# Patient Record
Sex: FEMALE | Race: WHITE | NOT HISPANIC OR LATINO | Employment: UNEMPLOYED | URBAN - METROPOLITAN AREA
[De-identification: names, ages, dates, MRNs, and addresses within clinical notes are randomized per-mention and may not be internally consistent; named-entity substitution may affect disease eponyms.]

---

## 2017-08-17 ENCOUNTER — LAB (OUTPATIENT)
Dept: LAB | Facility: HOSPITAL | Age: 82
End: 2017-08-17

## 2017-08-17 ENCOUNTER — APPOINTMENT (OUTPATIENT)
Dept: ONCOLOGY | Facility: CLINIC | Age: 82
End: 2017-08-17

## 2017-08-17 ENCOUNTER — APPOINTMENT (OUTPATIENT)
Dept: LAB | Facility: HOSPITAL | Age: 82
End: 2017-08-17

## 2017-08-17 ENCOUNTER — OFFICE VISIT (OUTPATIENT)
Dept: ONCOLOGY | Facility: CLINIC | Age: 82
End: 2017-08-17

## 2017-08-17 VITALS
SYSTOLIC BLOOD PRESSURE: 138 MMHG | BODY MASS INDEX: 22.73 KG/M2 | HEART RATE: 54 BPM | TEMPERATURE: 97.7 F | RESPIRATION RATE: 12 BRPM | OXYGEN SATURATION: 95 % | DIASTOLIC BLOOD PRESSURE: 78 MMHG | HEIGHT: 66 IN | WEIGHT: 141.4 LBS

## 2017-08-17 DIAGNOSIS — C82.00 FOLLICULAR LYMPHOMA GRADE I, UNSPECIFIED BODY REGION (HCC): Primary | ICD-10-CM

## 2017-08-17 DIAGNOSIS — C82.00 FOLLICULAR LYMPHOMA GRADE I, UNSPECIFIED SITE (HCC): Primary | ICD-10-CM

## 2017-08-17 LAB
BASOPHILS # BLD AUTO: 0.05 10*3/MM3 (ref 0–0.1)
BASOPHILS NFR BLD AUTO: 0.7 % (ref 0–1.1)
DEPRECATED RDW RBC AUTO: 46.6 FL (ref 37–49)
EOSINOPHIL # BLD AUTO: 0.18 10*3/MM3 (ref 0–0.36)
EOSINOPHIL NFR BLD AUTO: 2.4 % (ref 1–5)
ERYTHROCYTE [DISTWIDTH] IN BLOOD BY AUTOMATED COUNT: 13.2 % (ref 11.7–14.5)
HCT VFR BLD AUTO: 45.9 % (ref 34–45)
HGB BLD-MCNC: 15.3 G/DL (ref 11.5–14.9)
IMM GRANULOCYTES # BLD: 0.03 10*3/MM3 (ref 0–0.03)
IMM GRANULOCYTES NFR BLD: 0.4 % (ref 0–0.5)
LYMPHOCYTES # BLD AUTO: 2.32 10*3/MM3 (ref 1–3.5)
LYMPHOCYTES NFR BLD AUTO: 31.1 % (ref 20–49)
MCH RBC QN AUTO: 31.8 PG (ref 27–33)
MCHC RBC AUTO-ENTMCNC: 33.3 G/DL (ref 32–35)
MCV RBC AUTO: 95.4 FL (ref 83–97)
MONOCYTES # BLD AUTO: 0.67 10*3/MM3 (ref 0.25–0.8)
MONOCYTES NFR BLD AUTO: 9 % (ref 4–12)
NEUTROPHILS # BLD AUTO: 4.2 10*3/MM3 (ref 1.5–7)
NEUTROPHILS NFR BLD AUTO: 56.4 % (ref 39–75)
NRBC BLD MANUAL-RTO: 0 /100 WBC (ref 0–0)
PLATELET # BLD AUTO: 150 10*3/MM3 (ref 150–375)
PMV BLD AUTO: 10.2 FL (ref 8.9–12.1)
RBC # BLD AUTO: 4.81 10*6/MM3 (ref 3.9–5)
WBC NRBC COR # BLD: 7.45 10*3/MM3 (ref 4–10)

## 2017-08-17 PROCEDURE — 36416 COLLJ CAPILLARY BLOOD SPEC: CPT | Performed by: INTERNAL MEDICINE

## 2017-08-17 PROCEDURE — 99213 OFFICE O/P EST LOW 20 MIN: CPT | Performed by: INTERNAL MEDICINE

## 2017-08-17 PROCEDURE — 85025 COMPLETE CBC W/AUTO DIFF WBC: CPT | Performed by: INTERNAL MEDICINE

## 2017-08-17 NOTE — PROGRESS NOTES
Subjective .     REASONS FOR FOLLOWUP:  Follicular lymphoma in 1994    HISTORY OF PRESENT ILLNESS:  The patient is a 86 y.o. year old female  who is here for follow-up with the above-mentioned history.    Denies fever or chills.  Denies unintentional significant weight loss  Denies drenching night sweats.  Denies pain.      Past Medical History:   Diagnosis Date   • Follicular lymphoma 1994   • Myocardial infarction     2011     Past Surgical History:   Procedure Laterality Date   • APPENDECTOMY     • CORONARY ARTERY BYPASS GRAFT  2011   • HYSTERECTOMY      for dysfunctional bleeding with hormone replacement therapy.       HEMATOLOGIC/ONCOLOGIC HISTORY:  (History from previous dates can be found in the separate document.)    MEDICATIONS    Current Outpatient Prescriptions:   •  aspirin 81 MG EC tablet, Take 81 mg by mouth daily., Disp: , Rfl:   •  metoprolol tartrate (LOPRESSOR) 25 MG tablet, Take 25 mg by mouth 2 (two) times a day., Disp: , Rfl:   •  NAMZARIC 28-10 MG capsule sustained-release 24 hr, , Disp: , Rfl:   •  simvastatin (ZOCOR) 20 MG tablet, Take 20 mg by mouth every night., Disp: , Rfl:     ALLERGIES:   No Known Allergies    SOCIAL HISTORY:       Social History     Social History   • Marital status:      Spouse name: Rodney   • Number of children: 7   • Years of education: N/A     Occupational History   •   Retired     [employer]     Social History Main Topics   • Smoking status: Smoker, Current Status Unknown   • Smokeless tobacco: Not on file   • Alcohol use No   • Drug use: Not on file   • Sexual activity: Not on file     Other Topics Concern   • Not on file     Social History Narrative         FAMILY HISTORY:  Family History   Problem Relation Age of Onset   • Hodgkin's lymphoma Sister    • Breast cancer Sister    • Cancer Sister      bladder   • Breast cancer Daughter    • Alzheimer's disease Sister        REVIEW OF SYSTEMS:  GENERAL: No change in appetite or weight;  "  No fevers, chills, sweats.    SKIN: No nonhealing lesions.   No rashes.  HEME/LYMPH: No easy bruising, bleeding.   No swollen nodes.   EYES: No vision changes or diplopia.   ENT: No tinnitus, hearing loss, gum bleeding, epistaxis, hoarseness or dysphagia.   RESPIRATORY: No cough, shortness of breath, hemoptysis or wheezing.   CVS: No chest pain, palpitations, orthopnea, dyspnea on exertion or PND.   GI: No melena or hematochezia.   No abdominal pain.  No nausea, vomiting, constipation, diarrhea  : No lower tract obstructive symptoms, dysuria or hematuria.   MUSCULOSKELETAL: See history of present illness   NEUROLOGICAL: No global weakness, loss of consciousness or seizures.   PSYCHIATRIC: No increased nervousness, mood changes or depression.     Objective    Vitals:    08/17/17 1509   BP: 138/78   Pulse: 54   Resp: 12   Temp: 97.7 °F (36.5 °C)   TempSrc: Oral   SpO2: 95%   Weight: 141 lb 6.4 oz (64.1 kg)   Height: 66.14\" (168 cm)   PainSc: 0-No pain     Current Status 8/17/2017   ECOG score 0      PHYSICAL EXAM:    GENERAL:  Well-developed, well-nourished in no acute distress.   SKIN:  Warm, dry without rashes, purpura or petechiae. Soft, subcutaneous small mass on her back.  HEAD:  Normocephalic.  EYES:  Pupils equal, round and reactive to light.  EOMs intact.  Conjunctivae normal.  EARS:  Hearing intact.  NOSE:  Septum midline.  No excoriations or nasal discharge.  MOUTH:  Tongue is well-papillated; no stomatitis or ulcers.  Lips normal.  THROAT:  Oropharynx without lesions or exudates.  NECK:  Supple with good range of motion; no thyromegaly or masses, no JVD.  LYMPHATICS:  No cervical, supraclavicular, axillary or inguinal adenopathy.  CHEST:  Lungs clear to percussion and auscultation. Good airflow.  CARDIAC:  Regular rate and rhythm without murmurs, rubs or gallops. Normal S1,S2.  ABDOMEN:  Soft, nontender with no organomegaly or masses.  EXTREMITIES:  No clubbing, cyanosis or edema.  NEUROLOGICAL:  " Cranial Nerves II-XII grossly intact.  No focal neurological deficits.  PSYCHIATRIC:  Normal affect and mood.      RECENT LABS:        WBC   Date/Time Value Ref Range Status   08/17/2017 02:58 PM 7.45 4.00 - 10.00 10*3/mm3 Final     Hemoglobin   Date/Time Value Ref Range Status   08/17/2017 02:58 PM 15.3 (H) 11.5 - 14.9 g/dL Final     Platelets   Date/Time Value Ref Range Status   08/17/2017 02:58  150 - 375 10*3/mm3 Final       Assessment/Plan     ASSESSMENT:  1.  Stage I follicular lymphoma.  Treated with radiation in 1994.  Appears to remain in remission.    2.  Subcutaneous soft nodule on her back.  This is most consistent with a lipoma.  She states her PCP also told her this.  This was a new issue for me to address today.        PLAN:  1.  MANKIT in one year with CBC

## 2018-09-17 ENCOUNTER — OFFICE VISIT (OUTPATIENT)
Dept: ONCOLOGY | Facility: CLINIC | Age: 83
End: 2018-09-17

## 2018-09-17 ENCOUNTER — LAB (OUTPATIENT)
Dept: LAB | Facility: HOSPITAL | Age: 83
End: 2018-09-17

## 2018-09-17 VITALS
DIASTOLIC BLOOD PRESSURE: 66 MMHG | HEIGHT: 66 IN | SYSTOLIC BLOOD PRESSURE: 140 MMHG | OXYGEN SATURATION: 98 % | TEMPERATURE: 98.6 F | HEART RATE: 53 BPM | RESPIRATION RATE: 16 BRPM | WEIGHT: 137.2 LBS | BODY MASS INDEX: 22.05 KG/M2

## 2018-09-17 DIAGNOSIS — C82.02 FOLLICULAR LYMPHOMA GRADE I OF INTRATHORACIC LYMPH NODES (HCC): Primary | ICD-10-CM

## 2018-09-17 LAB
BASOPHILS # BLD AUTO: 0.05 10*3/MM3 (ref 0–0.1)
BASOPHILS NFR BLD AUTO: 0.8 % (ref 0–1.1)
DEPRECATED RDW RBC AUTO: 44.9 FL (ref 37–49)
EOSINOPHIL # BLD AUTO: 0.2 10*3/MM3 (ref 0–0.36)
EOSINOPHIL NFR BLD AUTO: 3.4 % (ref 1–5)
ERYTHROCYTE [DISTWIDTH] IN BLOOD BY AUTOMATED COUNT: 12.6 % (ref 11.7–14.5)
HCT VFR BLD AUTO: 43.9 % (ref 34–45)
HGB BLD-MCNC: 14.5 G/DL (ref 11.5–14.9)
IMM GRANULOCYTES # BLD: 0.04 10*3/MM3 (ref 0–0.03)
IMM GRANULOCYTES NFR BLD: 0.7 % (ref 0–0.5)
LYMPHOCYTES # BLD AUTO: 1.58 10*3/MM3 (ref 1–3.5)
LYMPHOCYTES NFR BLD AUTO: 26.5 % (ref 20–49)
MCH RBC QN AUTO: 31.9 PG (ref 27–33)
MCHC RBC AUTO-ENTMCNC: 33 G/DL (ref 32–35)
MCV RBC AUTO: 96.5 FL (ref 83–97)
MONOCYTES # BLD AUTO: 0.68 10*3/MM3 (ref 0.25–0.8)
MONOCYTES NFR BLD AUTO: 11.4 % (ref 4–12)
NEUTROPHILS # BLD AUTO: 3.41 10*3/MM3 (ref 1.5–7)
NEUTROPHILS NFR BLD AUTO: 57.2 % (ref 39–75)
NRBC BLD MANUAL-RTO: 0 /100 WBC (ref 0–0)
PLATELET # BLD AUTO: 160 10*3/MM3 (ref 150–375)
PMV BLD AUTO: 9.6 FL (ref 8.9–12.1)
RBC # BLD AUTO: 4.55 10*6/MM3 (ref 3.9–5)
WBC NRBC COR # BLD: 5.96 10*3/MM3 (ref 4–10)

## 2018-09-17 PROCEDURE — 99212 OFFICE O/P EST SF 10 MIN: CPT | Performed by: INTERNAL MEDICINE

## 2018-09-17 PROCEDURE — 36416 COLLJ CAPILLARY BLOOD SPEC: CPT | Performed by: INTERNAL MEDICINE

## 2018-09-17 PROCEDURE — 85025 COMPLETE CBC W/AUTO DIFF WBC: CPT | Performed by: INTERNAL MEDICINE

## 2018-09-17 NOTE — PROGRESS NOTES
Subjective .     REASONS FOR FOLLOWUP:  Follicular lymphoma in 1994    HISTORY OF PRESENT ILLNESS:  The patient is a 87 y.o. year old female  who is here for follow-up with the above-mentioned history.    Denies fevers, chills, weight loss, night sweats.    Recent upper respiratory infection.  Completed antibiotics through PCP.  Improving.    Past Medical History:   Diagnosis Date   • Follicular lymphoma (CMS/HCC) 1994   • Myocardial infarction     2011     Past Surgical History:   Procedure Laterality Date   • APPENDECTOMY     • CORONARY ARTERY BYPASS GRAFT  2011   • HYSTERECTOMY      for dysfunctional bleeding with hormone replacement therapy.       HEMATOLOGIC/ONCOLOGIC HISTORY:  (History from previous dates can be found in the separate document.)    MEDICATIONS    Current Outpatient Prescriptions:   •  aspirin 81 MG EC tablet, Take 81 mg by mouth daily., Disp: , Rfl:   •  metoprolol tartrate (LOPRESSOR) 25 MG tablet, Take 25 mg by mouth 2 (two) times a day., Disp: , Rfl:   •  NAMZARIC 28-10 MG capsule sustained-release 24 hr, , Disp: , Rfl:   •  simvastatin (ZOCOR) 20 MG tablet, Take 20 mg by mouth every night., Disp: , Rfl:   •  PROAIR  (90 Base) MCG/ACT inhaler, , Disp: , Rfl:     ALLERGIES:   No Known Allergies    SOCIAL HISTORY:       Social History     Social History   • Marital status:      Spouse name: Rodney   • Number of children: 7   • Years of education: N/A     Occupational History   •   Retired     [employer]     Social History Main Topics   • Smoking status: Smoker, Current Status Unknown   • Smokeless tobacco: Not on file   • Alcohol use No   • Drug use: Unknown   • Sexual activity: Not on file     Other Topics Concern   • Not on file     Social History Narrative   • No narrative on file         FAMILY HISTORY:  Family History   Problem Relation Age of Onset   • Hodgkin's lymphoma Sister    • Breast cancer Sister    • Cancer Sister         bladder   • Breast  "cancer Daughter    • Alzheimer's disease Sister        REVIEW OF SYSTEMS:  Review of Systems   Constitutional: Negative for activity change.   HENT: Negative for nosebleeds and trouble swallowing.    Respiratory: Negative for shortness of breath and wheezing.    Cardiovascular: Negative for chest pain and palpitations.   Gastrointestinal: Negative for constipation, diarrhea and nausea.   Genitourinary: Negative for dysuria and hematuria.   Musculoskeletal: Negative for arthralgias and myalgias.   Skin: Negative for rash and wound.   Neurological: Negative for seizures and syncope.   Hematological: Negative for adenopathy. Does not bruise/bleed easily.   Psychiatric/Behavioral: Negative for confusion.        Objective    Vitals:    09/17/18 1422   BP: 140/66   Pulse: 53   Resp: 16   Temp: 98.6 °F (37 °C)   SpO2: 98%   Weight: 62.2 kg (137 lb 3.2 oz)   Height: 168 cm (66.14\")  Comment: new ht w/shoes   PainSc: 0-No pain     Current Status 9/17/2018   ECOG score 0      PHYSICAL EXAM:    CONSTITUTIONAL:  Vital signs reviewed.  No distress, looks comfortable.  Looks younger than her age  EYES:  Conjunctiva and lids unremarkable.  PERRLA  EARS,NOSE,MOUTH,THROAT:  Ears and nose appear unremarkable.  Lips, teeth, gums appear unremarkable.  RESPIRATORY:  Normal respiratory effort.  Lungs clear to auscultation bilaterally.  CARDIOVASCULAR:  Normal S1, S2.  No murmurs rubs or gallops.  No significant lower extremity edema.  GASTROINTESTINAL: Abdomen appears unremarkable.  Nontender.  No hepatomegaly.  No splenomegaly.  LYMPHATIC:  No cervical, supraclavicular, axillary lymphadenopathy.  SKIN:  Warm.  No rashes.  PSYCHIATRIC:  Normal judgment and insight.  Normal mood and affect.      RECENT LABS:        WBC   Date/Time Value Ref Range Status   09/17/2018 01:59 PM 5.96 4.00 - 10.00 10*3/mm3 Final     Hemoglobin   Date/Time Value Ref Range Status   09/17/2018 01:59 PM 14.5 11.5 - 14.9 g/dL Final     Platelets   Date/Time Value " Ref Range Status   09/17/2018 01:59  150 - 375 10*3/mm3 Final       Assessment/Plan     ASSESSMENT:  1.  Stage I follicular lymphoma.  Treated with radiation in 1994.  Appears to remain in remission.        PLAN:  M.D. CBC 1 year    Daughter assisted with history.

## 2020-09-22 ENCOUNTER — OFFICE VISIT CONVERTED (OUTPATIENT)
Dept: CARDIOLOGY | Facility: CLINIC | Age: 85
End: 2020-09-22
Attending: INTERNAL MEDICINE

## 2020-09-22 ENCOUNTER — CONVERSION ENCOUNTER (OUTPATIENT)
Dept: OTHER | Facility: HOSPITAL | Age: 85
End: 2020-09-22

## 2021-05-13 NOTE — PROGRESS NOTES
Progress Note      Patient Name: Carole Correia   Patient ID: [id number]   Sex: Female   YOB: 1931    Primary Care Provider: Elza Hogan MD   Referring Provider: Elza Hogan MD    Visit Date: September 22, 2020    Provider: Nate Dunham MD   Location: [facility]   Location Address: [address] [place] [address]  [id number]          History Of Present Illness  REFERRING CARE PROVIDER: Elza Hogan MD   Dear Elza, I saw Carole Correia in the office today. This is for a follow-up evaluation and management of coronary artery disease with previous CABG in 2011, hypertension, dyslipidemia. These problems have been stable since the last visit. I saw Ms. Correia last in January 2019. She is re-establishing care in my practice. She has been very stable clinically. She has some mild baseline dementia, but no clinical complaints. She denies chest pain, shortness of breath or palpitations. She is compliant with her medications. She has not had any illnesses or hospitalizations or recent cardiac workup.   PAST MEDICAL HISTORY: includes coronary artery disease with previous CABG in 2011; hypertension; dyslipidemia.   PSYCHOSOCIAL HISTORY: Positive for mood changes and depression. She rarely drinks alcohol. She smokes 1 pack of cigarettes per day.   CURRENT MEDICATIONS: include aspirin 81 mg daily; Omeprazole 20 mg daily; OTC allergy pill; Namzaric 28 mg daily; Citalopram 20 mg daily; Metoprolol 25 mg twice a day; Simvastatin 20 mg daily; Vitamin D. The dosage and frequency of the medications were reviewed with the patient.       Review of Systems  · Cardiovascular  o Denies  o : palpitations (fast, fluttering, or skipping beats), swelling (feet, ankles, hands), shortness of breath while walking or lying flat, chest pain or angina pectoris   · Respiratory  o Denies  o : chronic or frequent cough, asthma or wheezing      Vitals  Date Time BP Position Site L\R Cuff  "Size HR RR TEMP (F) WT  HT  BMI kg/m2 BSA m2 O2 Sat HC       09/22/2020 01:40 /63 Sitting    50 - R   131lbs 0oz 5'  7\" 20.52 1.68           Physical Examination  · Respiratory  o Auscultation of Lungs  o : Clear to auscultation bilaterally. No crackles or rhonchi.  · Cardiovascular  o Heart  o : S1, S2 is normally heard. No S3. No murmur, rubs, or gallops. Mildly bradycardic rhythm.  · Gastrointestinal  o Abdominal Examination  o : Soft, nontender, nondistended. No free fluid. Bowel sounds heard in all four quadrants.  · Extremities  o Extremities  o : Warm and well perfused. No pitting pedal edema. Distal pulses present.     EKG was performed in the office today.  Indication:       coronary artery disease.  Results:          sinus bradycardia, rate 49.  Otherwise normal EKG.  Comparison:    No change from previous tracing.    Laboratory studies recently showed normal CBC, normal TSH; LDL 56, HDL 40, ; chemistry normal.    Primary care records were reviewed.             Assessment     1.  Coronary artery disease with previous CABG 2011 - The patient is clinically stable.  She is not having cardiac       symptoms at this time.  2.  Hypertension - Controlled.  3.  Dyslipidemia - Well controlled.  4.  Smoking - The patient is not interested in quitting.  This has been discussed on multiple previous visits.       Plan     1.  Continue current medical therapy.  2.  Follow up annually unless clinical symptoms arise.    It is a pleasure to assist in her care.   Please let me know if you have any questions regarding her case.    Sincerely,       EVA murguia/jay           This note was transcribed by Ginette Reno.  dmd/cbd  The above service was transcribed by Ginette Reno, and I attest to the accuracy of the note.  CBD.             Electronically Signed by: Ginette Reno-, -Author on September 25, 2020 10:37:05 AM  Electronically Co-signed by: Nate Dunham MD -Reviewer on " September 25, 2020 12:40:39 PM

## 2021-05-14 VITALS
SYSTOLIC BLOOD PRESSURE: 130 MMHG | DIASTOLIC BLOOD PRESSURE: 63 MMHG | HEART RATE: 50 BPM | HEIGHT: 67 IN | WEIGHT: 131 LBS | BODY MASS INDEX: 20.56 KG/M2

## 2021-09-28 ENCOUNTER — OFFICE VISIT (OUTPATIENT)
Dept: CARDIOLOGY | Facility: CLINIC | Age: 86
End: 2021-09-28

## 2021-09-28 VITALS
HEART RATE: 52 BPM | HEIGHT: 67 IN | DIASTOLIC BLOOD PRESSURE: 66 MMHG | WEIGHT: 143 LBS | SYSTOLIC BLOOD PRESSURE: 108 MMHG | BODY MASS INDEX: 22.44 KG/M2

## 2021-09-28 DIAGNOSIS — I25.10 CORONARY ARTERY DISEASE INVOLVING NATIVE CORONARY ARTERY OF NATIVE HEART WITHOUT ANGINA PECTORIS: Primary | ICD-10-CM

## 2021-09-28 DIAGNOSIS — E78.2 MIXED HYPERLIPIDEMIA: ICD-10-CM

## 2021-09-28 DIAGNOSIS — I10 ESSENTIAL HYPERTENSION: ICD-10-CM

## 2021-09-28 PROCEDURE — 99214 OFFICE O/P EST MOD 30 MIN: CPT | Performed by: INTERNAL MEDICINE

## 2021-09-28 RX ORDER — CITALOPRAM 20 MG/1
TABLET ORAL DAILY
COMMUNITY
Start: 2021-09-08

## 2021-09-28 NOTE — PROGRESS NOTES
Chief Complaint  1 YEAR F/U    Subjective            Carole Correia presents to [facility] CARDIOLOG[facility]  History of Present Illness    Carole is a 89-year-old female here today for follow-up evaluation and management of coronary artery disease with previous CABG in 2011, hypertension, and dyslipidemia.  Carole is doing very well and voices no complaints whatsoever.  She denies chest pain, shortness of breath, or palpitations. She has not had any recent illnesses, hospitalizations or recent cardiac work-up.    PMH  Past Medical History:   Diagnosis Date   • Follicular lymphoma (CMS/HCC) 1994   • Myocardial infarction (CMS/HCC)     2011         SURGICALHX  Past Surgical History:   Procedure Laterality Date   • APPENDECTOMY     • CORONARY ARTERY BYPASS GRAFT  2011   • HYSTERECTOMY      for dysfunctional bleeding with hormone replacement therapy.        SOC  Social History     Socioeconomic History   • Marital status:      Spouse name: Rodney   • Number of children: 7   • Years of education: Not on file   • Highest education level: Not on file   Tobacco Use   • Smoking status: Former Smoker   • Smokeless tobacco: Never Used   Vaping Use   • Vaping Use: Every day   • Substances: Nicotine, Flavoring   • Devices: Pre-filled or refillable cartridge   Substance and Sexual Activity   • Alcohol use: Yes     Comment: OCCASIONALLY   • Drug use: Never   • Sexual activity: Defer         FAMHX  Family History   Problem Relation Age of Onset   • Hodgkin's lymphoma Sister    • Breast cancer Sister    • Cancer Sister         bladder   • Breast cancer Daughter    • Alzheimer's disease Sister           ALLERGY  No Known Allergies     MEDSCURRENT    Current Outpatient Medications:   •  aspirin 81 MG EC tablet, Take 81 mg by mouth daily., Disp: , Rfl:   •  citalopram (CeleXA) 20 MG tablet, Daily., Disp: , Rfl:   •  metoprolol tartrate (LOPRESSOR) 25 MG tablet, Take 25 mg by mouth 2 (two) times a day., Disp: , Rfl:  "  •  NAMZARIC 28-10 MG capsule sustained-release 24 hr, , Disp: , Rfl:   •  PROAIR  (90 Base) MCG/ACT inhaler, , Disp: , Rfl:   •  simvastatin (ZOCOR) 20 MG tablet, Take 20 mg by mouth every night., Disp: , Rfl:       Review of Systems   Cardiovascular: Negative for chest pain, dyspnea on exertion, irregular heartbeat and leg swelling.   Respiratory: Negative for shortness of breath.         Objective     /66   Pulse 52   Ht 170.2 cm (67\")   Wt 64.9 kg (143 lb)   BMI 22.40 kg/m²       General Appearance:   · well developed  · well nourished  HENT:   · oropharynx moist  · lips not cyanotic  Neck:  · thyroid not enlarged  · supple  Respiratory:  · no respiratory distress  · normal breath sounds  · no rales  Cardiovascular:  · no jugular venous distention  · regular rhythm  · apical impulse normal  · S1 normal, S2 normal  · no S3, no S4   · no murmur  · no rub, no thrill  · carotid pulses normal; no bruit  · pedal pulses normal  · lower extremity edema: none    Musculoskeletal:  · no clubbing of fingers.   · normocephalic, head atraumatic  Skin:   · warm, dry  Psychiatric:  · judgement and insight appropriate  · normal mood and affect      Result Review :             Data reviewed: Reviewed recent laboratory studies, including CBC, BMP, TSH, LDL is 83.  Primary care records reviewed./         Carole Correia  reports that she has quit smoking. She has never used smokeless tobacco.. I have educated her on the risk of diseases from using tobacco products such as cancer, COPD and heart disease.        Assessment and Plan        ASSESSMENT:  Encounter Diagnoses   Name Primary?   • Coronary artery disease involving native coronary artery of native heart without angina pectoris Yes   • Essential hypertension    • Mixed hyperlipidemia          PLAN:    1. Coronary artery disease- clinically stable. CABG 2011. No symptoms of angina. Continue daily aspirin and beta blocker.   2.  Hypertension- controlled.  3.  " Hyperlipidemia- LDL 63. No longer on statin therapy. Goal LDL for Carole is <70 which is achieved. No medical therapy necessary at this time.     Carole is agreeable to the plan of care and will follow up annually unless problems arise.           Patient was given instructions and counseling regarding her condition or for health maintenance advice. Please see specific information pulled into the AVS if appropriate.           Elza oRdrigues, MAURILIO   9/28/2021  12:45 EDT

## 2022-10-25 ENCOUNTER — OFFICE VISIT (OUTPATIENT)
Dept: CARDIOLOGY | Facility: CLINIC | Age: 87
End: 2022-10-25

## 2022-10-25 VITALS
BODY MASS INDEX: 21.82 KG/M2 | DIASTOLIC BLOOD PRESSURE: 81 MMHG | HEART RATE: 62 BPM | WEIGHT: 139 LBS | SYSTOLIC BLOOD PRESSURE: 140 MMHG | HEIGHT: 67 IN

## 2022-10-25 DIAGNOSIS — I10 ESSENTIAL HYPERTENSION: ICD-10-CM

## 2022-10-25 DIAGNOSIS — E78.2 MIXED HYPERLIPIDEMIA: ICD-10-CM

## 2022-10-25 DIAGNOSIS — I25.10 CORONARY ARTERY DISEASE INVOLVING NATIVE CORONARY ARTERY OF NATIVE HEART WITHOUT ANGINA PECTORIS: Primary | ICD-10-CM

## 2022-10-25 PROCEDURE — 99214 OFFICE O/P EST MOD 30 MIN: CPT | Performed by: INTERNAL MEDICINE

## 2022-10-25 RX ORDER — LORATADINE 10 MG/1
TABLET ORAL
COMMUNITY

## 2022-10-25 NOTE — PROGRESS NOTES
Chief Complaint  Coronary Artery Disease    Subjective            Carole Correia presents to [facility] CARDIOLOGY  History of Present Illness    Ms. Correia is here for follow-up evaluation management of coronary artery disease, prior CABG, hypertension, mixed hyperlipidemia.  Overall she is doing very well.  She had an emergency room visit a couple of months ago.  She was somewhat lethargic, having difficulty ambulating and the family brought her to the ER.  She was found to have a UTI and her blood pressure was quite low at that point.  At that time she was taken off of her metoprolol.  Since being treated for the UTI she is back to normal.  She has no complaints.  Her blood pressures have been stable and she has not resumed the metoprolol.    PMH  Past Medical History:   Diagnosis Date   • Follicular lymphoma (HCC) 1994   • Myocardial infarction (HCC)     2011         SURGICALHX  Past Surgical History:   Procedure Laterality Date   • APPENDECTOMY     • CORONARY ARTERY BYPASS GRAFT  2011   • HYSTERECTOMY      for dysfunctional bleeding with hormone replacement therapy.        SOC  Social History     Socioeconomic History   • Marital status:      Spouse name: Rodney   • Number of children: 7   Tobacco Use   • Smoking status: Former   • Smokeless tobacco: Never   Vaping Use   • Vaping Use: Every day   • Substances: Nicotine, Flavoring   • Devices: Pre-filled or refillable cartridge   Substance and Sexual Activity   • Alcohol use: Yes     Comment: OCCASIONALLY   • Drug use: Never   • Sexual activity: Defer         FAMHX  Family History   Problem Relation Age of Onset   • Hodgkin's lymphoma Sister    • Breast cancer Sister    • Cancer Sister         bladder   • Breast cancer Daughter    • Alzheimer's disease Sister           ALLERGY  No Known Allergies     MEDSCURRENT    Current Outpatient Medications:   •  aspirin 81 MG EC tablet, Take 81 mg by mouth daily., Disp: , Rfl:   •  citalopram  "(CeleXA) 20 MG tablet, Daily., Disp: , Rfl:   •  loratadine (CLARITIN) 10 MG tablet, Claritin 10 mg tablet  Take 1 tablet every day by oral route., Disp: , Rfl:   •  NAMZARIC 28-10 MG capsule sustained-release 24 hr, , Disp: , Rfl:   •  PROAIR  (90 Base) MCG/ACT inhaler, , Disp: , Rfl:   •  metoprolol tartrate (LOPRESSOR) 25 MG tablet, Take 25 mg by mouth 2 (two) times a day., Disp: , Rfl:   •  simvastatin (ZOCOR) 20 MG tablet, Take 20 mg by mouth every night., Disp: , Rfl:       ROS     Objective     /81   Pulse 62   Ht 170.2 cm (67\")   Wt 63 kg (139 lb)   BMI 21.77 kg/m²       General Appearance:   · well developed  · well nourished  HENT:   · oropharynx moist  · lips not cyanotic  Neck:  · thyroid not enlarged  · supple  Respiratory:  · no respiratory distress  · normal breath sounds  · no rales  Cardiovascular:  · no jugular venous distention  · regular rhythm  · apical impulse normal  · S1 normal, S2 normal  · no S3, no S4   · no murmur  · no rub, no thrill  · carotid pulses normal; no bruit  · pedal pulses normal  · lower extremity edema: none    Musculoskeletal:  · no clubbing of fingers.   · normocephalic, head atraumatic  Skin:   · warm, dry  Psychiatric:  · judgement and insight appropriate  · normal mood and affect      Result Review :             Data reviewed: Primary care records reviewed     Procedures                Assessment and Plan        ASSESSMENT:  Encounter Diagnoses   Name Primary?   • Coronary artery disease involving native coronary artery of native heart without angina pectoris Yes   • Essential hypertension    • Mixed hyperlipidemia          PLAN:    1.  Coronary artery disease, prior CABG.  She is clinically stable, continue aspirin therapy.  Due to her advanced age we previously have stopped statin preventative therapy.  2.  Essential hypertension, stable currently.  Currently she does not need blood pressure medication.  3.  Mixed hyperlipidemia, in discussion with " the patient and family we previously have elected not to keep her on preventative statin therapy    Follow-up will be annually      Patient was given instructions and counseling regarding her condition or for health maintenance advice. Please see specific information pulled into the AVS if appropriate.             AMADO Dunham MD  10/25/2022    12:22 EDT

## 2023-10-31 ENCOUNTER — OFFICE VISIT (OUTPATIENT)
Dept: CARDIOLOGY | Facility: CLINIC | Age: 88
End: 2023-10-31
Payer: MEDICARE

## 2023-10-31 VITALS
BODY MASS INDEX: 21.35 KG/M2 | HEIGHT: 67 IN | SYSTOLIC BLOOD PRESSURE: 132 MMHG | DIASTOLIC BLOOD PRESSURE: 67 MMHG | WEIGHT: 136 LBS | HEART RATE: 72 BPM

## 2023-10-31 DIAGNOSIS — R55 SYNCOPE AND COLLAPSE: Primary | ICD-10-CM

## 2023-10-31 DIAGNOSIS — I25.10 CORONARY ARTERY DISEASE INVOLVING NATIVE CORONARY ARTERY OF NATIVE HEART WITHOUT ANGINA PECTORIS: ICD-10-CM

## 2023-10-31 DIAGNOSIS — I10 ESSENTIAL HYPERTENSION: ICD-10-CM

## 2023-10-31 DIAGNOSIS — I44.1 SECOND DEGREE AV BLOCK: ICD-10-CM

## 2023-10-31 PROCEDURE — 1160F RVW MEDS BY RX/DR IN RCRD: CPT | Performed by: INTERNAL MEDICINE

## 2023-10-31 PROCEDURE — 99214 OFFICE O/P EST MOD 30 MIN: CPT | Performed by: INTERNAL MEDICINE

## 2023-10-31 PROCEDURE — 1159F MED LIST DOCD IN RCRD: CPT | Performed by: INTERNAL MEDICINE

## 2023-10-31 RX ORDER — ACETAMINOPHEN 160 MG
2000 TABLET,DISINTEGRATING ORAL DAILY
COMMUNITY

## 2023-10-31 NOTE — Clinical Note
This patient got a [employer] Pacemaker at [place] recently.  She is my long term patient.  Need to get the remotes switched to us.  I will see her in 3 months with another check also.

## 2023-10-31 NOTE — PROGRESS NOTES
Chief Complaint  Coronary Artery Disease, Hypertension, Hyperlipidemia, and pacemaker follow up     Subjective            Carole Correia presents to [facility] CARDIOLOGY      Ms. Correia is here for follow-up evaluation management of coronary artery disease, prior CABG, hypertension, mixed hyperlipidemia.  Recently she began to have unexplained falls/syncope and was evaluated at [facility].  She stayed overnight and had no major issues but another syncopal episode just at the time of discharge and she then took herself with family to [place] in [place] for second opinion and was observed to have high-grade AV block intermittently.  She underwent pacemaker implantation October 9.  Since discharge from there she has had no further issues.  She denies chest pain palpitations or excessive shortness of breath.    PMH  Past Medical History:   Diagnosis Date    Follicular lymphoma 1994    Myocardial infarction     2011         SURGICALHX  Past Surgical History:   Procedure Laterality Date    APPENDECTOMY      CORONARY ARTERY BYPASS GRAFT  2011    HYSTERECTOMY      for dysfunctional bleeding with hormone replacement therapy.    PACEMAKER IMPLANTATION          SOC  Social History     Socioeconomic History    Marital status:      Spouse name: Rodney    Number of children: 7   Tobacco Use    Smoking status: Former    Smokeless tobacco: Never   Vaping Use    Vaping Use: Some days    Substances: Nicotine, Flavoring    Devices: Pre-filled or refillable cartridge   Substance and Sexual Activity    Alcohol use: Yes     Comment: OCCASIONALLY    Drug use: Never    Sexual activity: Defer         FAMHX  Family History   Problem Relation Age of Onset    Hodgkin's lymphoma Sister     Breast cancer Sister     Cancer Sister         bladder    Breast cancer Daughter     Alzheimer's disease Sister           ALLERGY  No Known Allergies     MEDSCURRENT    Current Outpatient Medications:     aspirin 81 MG EC  "tablet, Take 1 tablet by mouth Daily., Disp: , Rfl:     Cholecalciferol (Vitamin D3) 50 MCG (2000 UT) capsule, Take 1 capsule by mouth Daily., Disp: , Rfl:     citalopram (CeleXA) 20 MG tablet, Daily., Disp: , Rfl:     Cyanocobalamin (VITAMIN B-12 IJ), Inject  as directed., Disp: , Rfl:     loratadine (CLARITIN) 10 MG tablet, Claritin 10 mg tablet  Take 1 tablet every day by oral route., Disp: , Rfl:     NAMZARIC 28-10 MG capsule sustained-release 24 hr, , Disp: , Rfl:     PROAIR  (90 Base) MCG/ACT inhaler, , Disp: , Rfl:     metoprolol tartrate (LOPRESSOR) 25 MG tablet, Take 25 mg by mouth 2 (two) times a day. (Patient not taking: Reported on 10/31/2023), Disp: , Rfl:     simvastatin (ZOCOR) 20 MG tablet, Take 20 mg by mouth every night. (Patient not taking: Reported on 10/31/2023), Disp: , Rfl:       Review of Systems   Cardiovascular:  Positive for near-syncope and syncope. Negative for chest pain.   Respiratory:  Negative for shortness of breath.         Objective     /67   Pulse 72   Ht 170.2 cm (67\")   Wt 61.7 kg (136 lb)   BMI 21.30 kg/m²       General Appearance:   well developed  well nourished  HENT:   oropharynx moist  lips not cyanotic  Neck:  thyroid not enlarged  supple  Respiratory:  no respiratory distress  normal breath sounds  no rales  Cardiovascular:  no jugular venous distention  regular rhythm  apical impulse normal  S1 normal, S2 normal  no S3, no S4   no murmur  no rub, no thrill  carotid pulses normal; no bruit  pedal pulses normal  lower extremity edema: none    Musculoskeletal:  no clubbing of fingers.   normocephalic, head atraumatic  Skin:   warm, dry, pacemaker site clean dry and intact and healing well  Psychiatric:  judgement and insight appropriate  normal mood and affect      Result Review :             Primary care records reviewed, emergency room records reviewed, records from [facility] reviewed    Procedures                Assessment and Plan  "       ASSESSMENT:  Encounter Diagnoses   Name Primary?    Syncope and collapse Yes    Second degree AV block     Coronary artery disease involving native coronary artery of native heart without angina pectoris     Essential hypertension          PLAN:    1.  Coronary artery disease, prior CABG. stable, continue current medical therapy  2.  Essential hypertension, controlled, continue current medical therapy  3.  Recent syncope and collapse with observed second-degree AV block-status post [employer] permanent pacemaker.  The site is healing well.  We will establish remote monitoring and bring her back in in 3 months for follow-up and pacemaker check    Follow-up 3 months with pacemaker check      Patient was given instructions and counseling regarding her condition or for health maintenance advice. Please see specific information pulled into the AVS if appropriate.             AMADO Dunham MD  10/31/2023    12:58 EDT

## 2024-02-20 ENCOUNTER — OFFICE VISIT (OUTPATIENT)
Dept: CARDIOLOGY | Facility: CLINIC | Age: 89
End: 2024-02-20
Payer: MEDICARE

## 2024-02-20 ENCOUNTER — CLINICAL SUPPORT NO REQUIREMENTS (OUTPATIENT)
Dept: CARDIOLOGY | Facility: CLINIC | Age: 89
End: 2024-02-20
Payer: MEDICARE

## 2024-02-20 VITALS
BODY MASS INDEX: 21.79 KG/M2 | OXYGEN SATURATION: 95 % | WEIGHT: 138.8 LBS | SYSTOLIC BLOOD PRESSURE: 125 MMHG | DIASTOLIC BLOOD PRESSURE: 77 MMHG | HEIGHT: 67 IN | HEART RATE: 67 BPM

## 2024-02-20 DIAGNOSIS — Z95.0 CARDIAC PACEMAKER: ICD-10-CM

## 2024-02-20 DIAGNOSIS — I10 ESSENTIAL HYPERTENSION: ICD-10-CM

## 2024-02-20 DIAGNOSIS — I44.1 SECOND DEGREE AV BLOCK: Primary | ICD-10-CM

## 2024-02-20 DIAGNOSIS — I25.10 CORONARY ARTERY DISEASE INVOLVING NATIVE CORONARY ARTERY OF NATIVE HEART WITHOUT ANGINA PECTORIS: ICD-10-CM

## 2024-02-20 DIAGNOSIS — Z95.0 PRESENCE OF CARDIAC PACEMAKER: ICD-10-CM

## 2024-02-20 PROCEDURE — 93280 PM DEVICE PROGR EVAL DUAL: CPT | Performed by: INTERNAL MEDICINE

## 2024-02-20 NOTE — PROGRESS NOTES
Normal Dual Chamber Pacemaker Device Interrogation and Device Testing.  Normal evaluation of device function and lead measurements.  No optimization was needed of parameters or maximization of device longevity.  Patient is on automated Home Remote Monitoring, Discussed with her daughter Michelle Pastrana that her monitor has become disconnected.  She will trouble shoot when she takes her back home [phone]

## 2024-02-20 NOTE — PROGRESS NOTES
Chief Complaint  Loss of Consciousness and Follow-up (3 month )    Subjective            Carole NO Correia presents to [facility] CARDIOLOGY      Ms. Correia is here for follow-up evaluation management of coronary artery disease, prior CABG, hypertension, mixed hyperlipidemia.  Most recently she had syncope with AV block and permanent pacemaker was placed in October 2023.  Since last visit she is doing well.  She has had no further syncopal episodes.  She denies chest pain palpitations or excessive shortness of breath.    PMH  Past Medical History:   Diagnosis Date    Follicular lymphoma 1994    Myocardial infarction     2011         SURGICALHX  Past Surgical History:   Procedure Laterality Date    APPENDECTOMY      CORONARY ARTERY BYPASS GRAFT  2011    HYSTERECTOMY      for dysfunctional bleeding with hormone replacement therapy.    PACEMAKER IMPLANTATION          SOC  Social History     Socioeconomic History    Marital status:      Spouse name: Rodney    Number of children: 7   Tobacco Use    Smoking status: Former    Smokeless tobacco: Never   Vaping Use    Vaping Use: Some days    Substances: Nicotine, Flavoring    Devices: Pre-filled or refillable cartridge   Substance and Sexual Activity    Alcohol use: Yes     Comment: OCCASIONALLY    Drug use: Never    Sexual activity: Defer         FAMHX  Family History   Problem Relation Age of Onset    Hodgkin's lymphoma Sister     Breast cancer Sister     Cancer Sister         bladder    Breast cancer Daughter     Alzheimer's disease Sister           ALLERGY  No Known Allergies     MEDSCURRENT    Current Outpatient Medications:     aspirin 81 MG EC tablet, Take 1 tablet by mouth Daily., Disp: , Rfl:     Cholecalciferol (Vitamin D3) 50 MCG (2000 UT) capsule, Take 1 capsule by mouth Daily., Disp: , Rfl:     citalopram (CeleXA) 20 MG tablet, Daily., Disp: , Rfl:     Cyanocobalamin (VITAMIN B-12 IJ), Inject  as directed., Disp: , Rfl:     loratadine  "(CLARITIN) 10 MG tablet, Claritin 10 mg tablet  Take 1 tablet every day by oral route., Disp: , Rfl:     NAMZARIC 28-10 MG capsule sustained-release 24 hr, , Disp: , Rfl:     PROAIR  (90 Base) MCG/ACT inhaler, , Disp: , Rfl:     metoprolol tartrate (LOPRESSOR) 25 MG tablet, Take 25 mg by mouth 2 (two) times a day. (Patient not taking: Reported on 10/31/2023), Disp: , Rfl:     simvastatin (ZOCOR) 20 MG tablet, Take 20 mg by mouth every night. (Patient not taking: Reported on 10/31/2023), Disp: , Rfl:       Review of Systems   Cardiovascular:  Negative for chest pain, near-syncope and syncope.   Respiratory:  Negative for shortness of breath.         Objective     /77   Pulse 67   Ht 170.2 cm (67\")   Wt 63 kg (138 lb 12.8 oz)   SpO2 95%   BMI 21.74 kg/m²       General Appearance:   well developed  well nourished  HENT:   oropharynx moist  lips not cyanotic  Neck:  thyroid not enlarged  supple  Respiratory:  no respiratory distress  normal breath sounds  no rales  Cardiovascular:  no jugular venous distention  regular rhythm  apical impulse normal  S1 normal, S2 normal  no S3, no S4   no murmur  no rub, no thrill  carotid pulses normal; no bruit  pedal pulses normal  lower extremity edema: none    Musculoskeletal:  no clubbing of fingers.   normocephalic, head atraumatic  Skin:   warm, dry, pacemaker site clean dry and intact and healing well  Psychiatric:  judgement and insight appropriate  normal mood and affect      Result Review :             Primary care records reviewed, pacemaker was interrogated today, normal device function, 22% atrial pacing, 28% ventricular pacing, 14-year battery life    Procedures                Assessment and Plan        ASSESSMENT:  Encounter Diagnoses   Name Primary?    Second degree AV block Yes    Presence of cardiac pacemaker     Coronary artery disease involving native coronary artery of native heart without angina pectoris     Essential hypertension  "         PLAN:    1.  Coronary artery disease, prior CABG. stable, continue current medical therapy  2.  Essential hypertension, controlled, continue current medical therapy  3.  Second-degree AV block with permanent pacemaker-the device is functioning normally today, she has had no further symptoms since implant.  Will get remote monitoring working and continue to monitor otherwise will need office interrogation annually    1 year with pacemaker interrogation      Patient was given instructions and counseling regarding her condition or for health maintenance advice. Please see specific information pulled into the AVS if appropriate.             AMADO Dunham MD  2/20/2024    14:10 EST

## 2024-05-13 ENCOUNTER — DOCUMENTATION (OUTPATIENT)
Dept: CARDIOLOGY | Facility: CLINIC | Age: 89
End: 2024-05-13
Payer: MEDICARE

## 2024-05-30 ENCOUNTER — TRANSCRIBE ORDERS (OUTPATIENT)
Age: 89
End: 2024-05-30
Payer: MEDICARE

## 2024-05-30 ENCOUNTER — TELEPHONE (OUTPATIENT)
Age: 89
End: 2024-05-30
Payer: MEDICARE

## 2024-05-30 DIAGNOSIS — I77.9 PERIPHERAL ARTERIAL OCCLUSIVE DISEASE: Primary | ICD-10-CM

## 2024-05-30 NOTE — TELEPHONE ENCOUNTER
Jennifer called wanting to move her appt up due to a necrotic right toe. Her appt has been moved up from 7/17 to 6/5 due to her toe becoming necrotic on the right foot.

## 2024-06-05 ENCOUNTER — OFFICE VISIT (OUTPATIENT)
Age: 89
End: 2024-06-05
Payer: MEDICARE

## 2024-06-05 ENCOUNTER — HOSPITAL ENCOUNTER (OUTPATIENT)
Facility: HOSPITAL | Age: 89
Discharge: HOME | End: 2024-06-05
Payer: MEDICARE

## 2024-06-05 VITALS
SYSTOLIC BLOOD PRESSURE: 125 MMHG | WEIGHT: 139 LBS | HEART RATE: 71 BPM | HEIGHT: 67 IN | DIASTOLIC BLOOD PRESSURE: 74 MMHG | BODY MASS INDEX: 21.82 KG/M2

## 2024-06-05 DIAGNOSIS — I70.235 ATHEROSCLEROSIS OF NATIVE ARTERY OF RIGHT LOWER EXTREMITY WITH ULCERATION OF OTHER PART OF FOOT: ICD-10-CM

## 2024-06-05 DIAGNOSIS — I70.235 ATHEROSCLEROSIS OF NATIVE ARTERY OF RIGHT LOWER EXTREMITY WITH ULCERATION OF OTHER PART OF FOOT: Primary | ICD-10-CM

## 2024-06-05 DIAGNOSIS — I25.10 CORONARY ARTERY DISEASE INVOLVING NATIVE CORONARY ARTERY OF NATIVE HEART WITHOUT ANGINA PECTORIS: ICD-10-CM

## 2024-06-05 DIAGNOSIS — N18.32 STAGE 3B CHRONIC KIDNEY DISEASE: ICD-10-CM

## 2024-06-05 LAB
BH CV LOWER ARTERIAL LEFT ABI RATIO: 0.57
BH CV LOWER ARTERIAL LEFT DORSALIS PEDIS SYS MAX: 56
BH CV LOWER ARTERIAL LEFT POST TIBIAL SYS MAX: 60
BH CV LOWER ARTERIAL RIGHT ABI RATIO: 0.85
BH CV LOWER ARTERIAL RIGHT DORSALIS PEDIS SYS MAX: 82
BH CV LOWER ARTERIAL RIGHT POST TIBIAL SYS MAX: 90
UPPER ARTERIAL LEFT ARM BRACHIAL SYS MAX: NORMAL
UPPER ARTERIAL RIGHT ARM BRACHIAL SYS MAX: NORMAL

## 2024-06-05 PROCEDURE — 93922 UPR/L XTREMITY ART 2 LEVELS: CPT

## 2024-06-05 RX ORDER — MEMANTINE HYDROCHLORIDE 28 MG/1
CAPSULE, EXTENDED RELEASE ORAL
COMMUNITY
Start: 2024-03-04

## 2024-06-14 ENCOUNTER — OUTSIDE FACILITY SERVICE (OUTPATIENT)
Dept: CARDIOLOGY | Facility: CLINIC | Age: 89
End: 2024-06-14
Payer: MEDICARE

## 2025-03-18 ENCOUNTER — OFFICE VISIT (OUTPATIENT)
Dept: CARDIOLOGY | Facility: CLINIC | Age: OVER 89
End: 2025-03-18
Payer: MEDICARE

## 2025-03-18 ENCOUNTER — CLINICAL SUPPORT NO REQUIREMENTS (OUTPATIENT)
Dept: CARDIOLOGY | Facility: CLINIC | Age: OVER 89
End: 2025-03-18
Payer: MEDICARE

## 2025-03-18 VITALS
HEART RATE: 66 BPM | DIASTOLIC BLOOD PRESSURE: 63 MMHG | WEIGHT: 134.8 LBS | BODY MASS INDEX: 21.16 KG/M2 | HEIGHT: 67 IN | SYSTOLIC BLOOD PRESSURE: 116 MMHG

## 2025-03-18 DIAGNOSIS — Z95.0 CARDIAC PACEMAKER: ICD-10-CM

## 2025-03-18 DIAGNOSIS — I25.10 CORONARY ARTERY DISEASE INVOLVING NATIVE CORONARY ARTERY OF NATIVE HEART WITHOUT ANGINA PECTORIS: Primary | ICD-10-CM

## 2025-03-18 DIAGNOSIS — R55 SYNCOPE AND COLLAPSE: Primary | ICD-10-CM

## 2025-03-18 DIAGNOSIS — I44.1 SECOND DEGREE AV BLOCK: ICD-10-CM

## 2025-03-18 DIAGNOSIS — I10 ESSENTIAL HYPERTENSION: ICD-10-CM

## 2025-03-18 DIAGNOSIS — Z95.0 PRESENCE OF CARDIAC PACEMAKER: ICD-10-CM

## 2025-03-18 PROCEDURE — 1160F RVW MEDS BY RX/DR IN RCRD: CPT | Performed by: INTERNAL MEDICINE

## 2025-03-18 PROCEDURE — 93280 PM DEVICE PROGR EVAL DUAL: CPT | Performed by: INTERNAL MEDICINE

## 2025-03-18 PROCEDURE — 99214 OFFICE O/P EST MOD 30 MIN: CPT | Performed by: INTERNAL MEDICINE

## 2025-03-18 PROCEDURE — 1159F MED LIST DOCD IN RCRD: CPT | Performed by: INTERNAL MEDICINE

## 2025-03-18 RX ORDER — DONEPEZIL HYDROCHLORIDE 10 MG/1
10 TABLET, FILM COATED ORAL NIGHTLY
COMMUNITY
Start: 2025-03-17

## 2025-03-18 NOTE — PROGRESS NOTES
Chief Complaint  Coronary Artery Disease and Follow-up (WITH PACEMAKER CHECK)    Subjective            Carole SARABIA Masood presents to [facility] CARDIOLOGY      Ms. Correia is here for follow-up evaluation management of coronary artery disease, prior CABG, hypertension, mixed hyperlipidemia.  She has second-degree AV block with permanent pacemaker.  Overall she is doing well.  She has no cardiovascular complaints at this time.    PMH  Past Medical History:   Diagnosis Date    Follicular lymphoma 1994    Hyperlipidemia     Myocardial infarction     2011         SURGICALHX  Past Surgical History:   Procedure Laterality Date    APPENDECTOMY      CORONARY ARTERY BYPASS GRAFT  2011    HYSTERECTOMY      for dysfunctional bleeding with hormone replacement therapy.    PACEMAKER IMPLANTATION          SOC  Social History     Socioeconomic History    Marital status:      Spouse name: Rodney    Number of children: 7   Tobacco Use    Smoking status: Former     Passive exposure: Past    Smokeless tobacco: Never   Vaping Use    Vaping status: Some Days    Substances: Nicotine, Flavoring    Devices: Pre-filled or refillable cartridge   Substance and Sexual Activity    Alcohol use: Yes     Comment: OCCASIONALLY    Drug use: Never    Sexual activity: Defer         FAMHX  Family History   Problem Relation Age of Onset    Hodgkin's lymphoma Sister     Breast cancer Sister     Cancer Sister         bladder    Breast cancer Daughter     Alzheimer's disease Sister           ALLERGY  No Known Allergies     MEDSCURRENT    Current Outpatient Medications:     aspirin 81 MG EC tablet, Take 1 tablet by mouth Daily., Disp: , Rfl:     Cholecalciferol (Vitamin D3) 50 MCG (2000 UT) capsule, Take 1 capsule by mouth Daily., Disp: , Rfl:     citalopram (CeleXA) 20 MG tablet, Daily., Disp: , Rfl:     Cyanocobalamin (VITAMIN B-12 IJ), Inject  as directed., Disp: , Rfl:     donepezil (ARICEPT) 10 MG tablet, Take 1 tablet by mouth Every  "Night., Disp: , Rfl:     loratadine (CLARITIN) 10 MG tablet, Claritin 10 mg tablet  Take 1 tablet every day by oral route., Disp: , Rfl:     memantine (NAMENDA XR) 28 MG capsule sustained-release 24 hr extended release capsule, , Disp: , Rfl:     metoprolol tartrate (LOPRESSOR) 25 MG tablet, Take 25 mg by mouth 2 (two) times a day. (Patient not taking: Reported on 10/31/2023), Disp: , Rfl:     PROAIR  (90 Base) MCG/ACT inhaler, , Disp: , Rfl:     simvastatin (ZOCOR) 20 MG tablet, Take 20 mg by mouth every night. (Patient not taking: Reported on 10/31/2023), Disp: , Rfl:       Review of Systems   Cardiovascular:  Negative for chest pain, near-syncope and syncope.   Respiratory:  Negative for shortness of breath.         Objective     /63   Pulse 66   Ht 170.2 cm (67\")   Wt 61.1 kg (134 lb 12.8 oz)   BMI 21.11 kg/m²       General Appearance:   well developed  well nourished  HENT:   oropharynx moist  lips not cyanotic  Neck:  thyroid not enlarged  supple  Respiratory:  no respiratory distress  normal breath sounds  no rales  Cardiovascular:  no jugular venous distention  regular rhythm  apical impulse normal  S1 normal, S2 normal  no S3, no S4   no murmur  no rub, no thrill  carotid pulses normal; no bruit  pedal pulses normal  lower extremity edema: none    Musculoskeletal:  no clubbing of fingers.   normocephalic, head atraumatic  Skin:   warm, dry, pacemaker site clean dry and intact and healing well  Psychiatric:  judgement and insight appropriate  normal mood and affect      Result Review :             Pacemaker interrogation today shows normal device function.  She has had some episodes of atrial fibrillation detected, they are less than 24 hours duration.  Overall 5.3% atrial fibrillation burden and asymptomatic    Procedures                Assessment and Plan        ASSESSMENT:  Encounter Diagnoses   Name Primary?    Coronary artery disease involving native coronary artery of native heart " without angina pectoris Yes    Essential hypertension     Second degree AV block     Presence of cardiac pacemaker          PLAN:    1.  Coronary artery disease, prior CABG. stable, continue current medical therapy  2.  Essential hypertension, controlled, continue current medical therapy  3.  Second-degree AV block with permanent pacemaker-the device is functioning normally today.  Continue remote monitoring  4.  Paroxysmal atrial fibrillation-this is noted on device monitoring.  The episodes have been medium length up to 13 hours duration.  She is asymptomatic to this.  I discussed with the patient that the risk versus the benefit of anticoagulation is somewhat debatable given the relative short durations but is not clear.  The bleeding risk is definitely higher in a 89-year-old patient.  I recommend that if she has more prolonged episodes of atrial fibrillation which will detect and alert through remote monitoring at greater than 24 hours, then anticoagulation can be considered, otherwise we will take a watchful waiting approach.  The patient and daughter are agreeable with this plan    1 year follow-up with pacemaker interrogation      Patient was given instructions and counseling regarding her condition or for health maintenance advice. Please see specific information pulled into the AVS if appropriate.             AMADO Dunham MD  3/18/2025    15:07 EDT

## 2025-07-03 ENCOUNTER — TELEPHONE (OUTPATIENT)
Dept: CARDIOLOGY | Facility: CLINIC | Age: OVER 89
End: 2025-07-03
Payer: COMMERCIAL

## 2025-07-03 NOTE — TELEPHONE ENCOUNTER
Pt called to report that "[employer] called but wouldn't give any information to her over the phone. Per Ila Reilly verbal- there hasn't been any reports on our end that would be of any concern.     Called and lvm x1 to give pt that report from Ila Reilly.

## 2025-07-31 LAB
MC_CV_MDC_IDC_RATE_1: 160
MC_CV_MDC_IDC_ZONE_ID: 1
MDC_IDC_MSMT_BATTERY_REMAINING_LONGEVITY: 144 MO
MDC_IDC_MSMT_BATTERY_REMAINING_PERCENTAGE: 100 %
MDC_IDC_MSMT_BATTERY_STATUS: NORMAL
MDC_IDC_MSMT_LEADCHNL_RA_DTM: NORMAL
MDC_IDC_MSMT_LEADCHNL_RA_IMPEDANCE_VALUE: 512
MDC_IDC_MSMT_LEADCHNL_RA_PACING_THRESHOLD_AMPLITUDE: 0.5
MDC_IDC_MSMT_LEADCHNL_RA_PACING_THRESHOLD_POLARITY: NORMAL
MDC_IDC_MSMT_LEADCHNL_RA_PACING_THRESHOLD_PULSEWIDTH: 0.4
MDC_IDC_MSMT_LEADCHNL_RA_SENSING_INTR_AMPL: 5.6
MDC_IDC_MSMT_LEADCHNL_RV_DTM: NORMAL
MDC_IDC_MSMT_LEADCHNL_RV_IMPEDANCE_VALUE: 604
MDC_IDC_MSMT_LEADCHNL_RV_PACING_THRESHOLD_AMPLITUDE: 0.8
MDC_IDC_MSMT_LEADCHNL_RV_PACING_THRESHOLD_POLARITY: NORMAL
MDC_IDC_MSMT_LEADCHNL_RV_PACING_THRESHOLD_PULSEWIDTH: 0.4
MDC_IDC_PG_IMPLANT_DTM: NORMAL
MDC_IDC_PG_MFG: NORMAL
MDC_IDC_PG_MODEL: NORMAL
MDC_IDC_PG_SERIAL: NORMAL
MDC_IDC_PG_TYPE: NORMAL
MDC_IDC_SESS_DTM: NORMAL
MDC_IDC_SESS_TYPE: NORMAL
MDC_IDC_SET_BRADY_AT_MODE_SWITCH_RATE: 170
MDC_IDC_SET_BRADY_LOWRATE: 60
MDC_IDC_SET_BRADY_MAX_SENSOR_RATE: 120
MDC_IDC_SET_BRADY_MAX_TRACKING_RATE: 120
MDC_IDC_SET_BRADY_MODE: NORMAL
MDC_IDC_SET_BRADY_PAV_DELAY: 180
MDC_IDC_SET_BRADY_SAV_DELAY: 180
MDC_IDC_SET_LEADCHNL_RA_PACING_AMPLITUDE: 2.2
MDC_IDC_SET_LEADCHNL_RA_PACING_POLARITY: NORMAL
MDC_IDC_SET_LEADCHNL_RA_PACING_PULSEWIDTH: 0.4
MDC_IDC_SET_LEADCHNL_RA_SENSING_POLARITY: NORMAL
MDC_IDC_SET_LEADCHNL_RA_SENSING_SENSITIVITY: 0.25
MDC_IDC_SET_LEADCHNL_RV_PACING_AMPLITUDE: 2.2
MDC_IDC_SET_LEADCHNL_RV_PACING_POLARITY: NORMAL
MDC_IDC_SET_LEADCHNL_RV_PACING_PULSEWIDTH: 0.4
MDC_IDC_SET_LEADCHNL_RV_SENSING_POLARITY: NORMAL
MDC_IDC_SET_LEADCHNL_RV_SENSING_SENSITIVITY: 0.6
MDC_IDC_SET_ZONE_STATUS: NORMAL
MDC_IDC_SET_ZONE_TYPE: NORMAL
MDC_IDC_STAT_AT_BURDEN_PERCENT: 2
MDC_IDC_STAT_BRADY_RA_PERCENT_PACED: 36
MDC_IDC_STAT_BRADY_RV_PERCENT_PACED: 97
